# Patient Record
Sex: FEMALE | Race: WHITE
[De-identification: names, ages, dates, MRNs, and addresses within clinical notes are randomized per-mention and may not be internally consistent; named-entity substitution may affect disease eponyms.]

---

## 2018-09-11 ENCOUNTER — HOSPITAL ENCOUNTER (OUTPATIENT)
Dept: HOSPITAL 53 - M LAB REF | Age: 28
End: 2018-09-11
Attending: OBSTETRICS & GYNECOLOGY
Payer: COMMERCIAL

## 2018-09-11 DIAGNOSIS — Z11.3: Primary | ICD-10-CM

## 2018-09-11 LAB
CHLAMYDIA DNA AMPLIFICATION: NEGATIVE
GC DNA AMPLIFICATION: NEGATIVE

## 2018-09-11 PROCEDURE — 87591 N.GONORRHOEAE DNA AMP PROB: CPT

## 2021-02-22 ENCOUNTER — HOSPITAL ENCOUNTER (OUTPATIENT)
Dept: HOSPITAL 53 - M SFHCADAM | Age: 31
End: 2021-02-22
Attending: PHYSICIAN ASSISTANT
Payer: COMMERCIAL

## 2021-02-22 DIAGNOSIS — R63.5: Primary | ICD-10-CM

## 2021-02-22 DIAGNOSIS — K90.41: ICD-10-CM

## 2021-02-22 DIAGNOSIS — R53.83: ICD-10-CM

## 2021-02-23 LAB
ALBUMIN SERPL BCG-MCNC: 4.1 GM/DL (ref 3.2–5.2)
ALT SERPL W P-5'-P-CCNC: 19 U/L (ref 12–78)
BASOPHILS # BLD AUTO: 0.1 10^3/UL (ref 0–0.2)
BASOPHILS NFR BLD AUTO: 1 % (ref 0–1)
BILIRUB SERPL-MCNC: 0.3 MG/DL (ref 0.2–1)
BUN SERPL-MCNC: 13 MG/DL (ref 7–18)
CALCIUM SERPL-MCNC: 9.5 MG/DL (ref 8.5–10.1)
CHLORIDE SERPL-SCNC: 102 MEQ/L (ref 98–107)
CO2 SERPL-SCNC: 31 MEQ/L (ref 21–32)
CREAT SERPL-MCNC: 1.1 MG/DL (ref 0.55–1.3)
EOSINOPHIL # BLD AUTO: 0.1 10^3/UL (ref 0–0.5)
EOSINOPHIL NFR BLD AUTO: 1.6 % (ref 0–3)
GFR SERPL CREATININE-BSD FRML MDRD: > 60 ML/MIN/{1.73_M2} (ref 60–?)
GLUCOSE SERPL-MCNC: 92 MG/DL (ref 70–100)
HCT VFR BLD AUTO: 42.3 % (ref 36–47)
HGB BLD-MCNC: 14.3 G/DL (ref 12–15.5)
LYMPHOCYTES # BLD AUTO: 1.4 10^3/UL (ref 1.5–5)
LYMPHOCYTES NFR BLD AUTO: 28.4 % (ref 24–44)
MCH RBC QN AUTO: 30.9 PG (ref 27–33)
MCHC RBC AUTO-ENTMCNC: 33.8 G/DL (ref 32–36.5)
MCV RBC AUTO: 91.4 FL (ref 80–96)
MONOCYTES # BLD AUTO: 0.4 10^3/UL (ref 0–0.8)
MONOCYTES NFR BLD AUTO: 8.3 % (ref 2–8)
NEUTROPHILS # BLD AUTO: 3 10^3/UL (ref 1.5–8.5)
NEUTROPHILS NFR BLD AUTO: 60.5 % (ref 36–66)
PLATELET # BLD AUTO: 229 10^3/UL (ref 150–450)
POTASSIUM SERPL-SCNC: 5.7 MEQ/L (ref 3.5–5.1)
PROT SERPL-MCNC: 7.5 GM/DL (ref 6.4–8.2)
RBC # BLD AUTO: 4.63 10^6/UL (ref 4–5.4)
SODIUM SERPL-SCNC: 139 MEQ/L (ref 136–145)
T4 FREE SERPL-MCNC: 0.9 NG/DL (ref 0.76–1.46)
TSH SERPL DL<=0.005 MIU/L-ACNC: 1 UIU/ML (ref 0.36–3.74)
WBC # BLD AUTO: 5 10^3/UL (ref 4–10)

## 2021-03-05 ENCOUNTER — HOSPITAL ENCOUNTER (OUTPATIENT)
Dept: HOSPITAL 53 - M SFHCWAGY | Age: 31
End: 2021-03-05
Attending: OBSTETRICS & GYNECOLOGY
Payer: COMMERCIAL

## 2021-03-05 DIAGNOSIS — Z12.4: Primary | ICD-10-CM

## 2021-03-05 DIAGNOSIS — R10.2: Primary | ICD-10-CM

## 2021-03-19 ENCOUNTER — HOSPITAL ENCOUNTER (OUTPATIENT)
Dept: HOSPITAL 53 - M WHC | Age: 31
End: 2021-03-19
Attending: OBSTETRICS & GYNECOLOGY
Payer: COMMERCIAL

## 2021-03-19 DIAGNOSIS — N83.202: Primary | ICD-10-CM

## 2021-03-19 DIAGNOSIS — Z97.5: ICD-10-CM

## 2021-03-19 NOTE — REP
INDICATION:

R10.2 PELVIC PAIN.



COMPARISON:

Comparison study October 19, 2016..



TECHNIQUE:

Transabdominal and transvaginal scanning were performed.



FINDINGS:

Uterine dimensions are normal at 10.1 x 3.7 x 5.0 cm.  Endometrial echo is seen to

contain the IUD in good position.  No free fluid is seen in the cul-de-sac.

Visualized bladder walls are smooth. No focal uterine mass is seen.



The right ovary has dimensions of 2.3 x 1.6 x 2.0 cm.





The left ovary dimensions are normal as well at 2.4 x 1.3 x 2.5 cm.  It's Doppler flow

was normal with resistive index of 0.58.

There is a 2.4 x 2.2 x 1.3 cm simple follicle cyst in the left ovary.



IMPRESSION:

IUD in place.  2.4 cm follicle cyst in the left ovary.  No significant abnormality..





<Electronically signed by Milton Munson > 03/19/21 [id number]